# Patient Record
Sex: FEMALE | Race: WHITE | Employment: FULL TIME | URBAN - METROPOLITAN AREA
[De-identification: names, ages, dates, MRNs, and addresses within clinical notes are randomized per-mention and may not be internally consistent; named-entity substitution may affect disease eponyms.]

---

## 2022-12-05 ENCOUNTER — OFFICE VISIT (OUTPATIENT)
Dept: OCCUPATIONAL MEDICINE | Age: 22
End: 2022-12-05

## 2022-12-05 VITALS
DIASTOLIC BLOOD PRESSURE: 89 MMHG | HEIGHT: 66 IN | HEART RATE: 98 BPM | TEMPERATURE: 98.3 F | OXYGEN SATURATION: 98 % | BODY MASS INDEX: 29.73 KG/M2 | WEIGHT: 185 LBS | RESPIRATION RATE: 18 BRPM | SYSTOLIC BLOOD PRESSURE: 125 MMHG

## 2022-12-05 DIAGNOSIS — R53.81 MALAISE: ICD-10-CM

## 2022-12-05 DIAGNOSIS — J02.0 STREP THROAT: ICD-10-CM

## 2022-12-05 DIAGNOSIS — J02.9 SORE THROAT: ICD-10-CM

## 2022-12-05 DIAGNOSIS — R52 BODY ACHES: Primary | ICD-10-CM

## 2022-12-05 PROBLEM — G43.909 MIGRAINE: Status: ACTIVE | Noted: 2022-12-05

## 2022-12-05 PROBLEM — J45.909 ASTHMA: Status: ACTIVE | Noted: 2022-12-05

## 2022-12-05 PROBLEM — Z86.16 HISTORY OF COVID-19: Status: ACTIVE | Noted: 2022-12-05

## 2022-12-05 PROBLEM — G90.A POSTURAL ORTHOSTATIC TACHYCARDIA SYNDROME (POTS): Status: ACTIVE | Noted: 2022-12-05

## 2022-12-05 LAB
GROUP A STREP ANTIGEN, POC: NEGATIVE
MONONUCLEOSIS SCREEN POC: NORMAL
SARS-COV-2, POC: NORMAL
VALID INTERNAL CONTROL, POC: YES
VALID INTERNAL CONTROL: YES

## 2022-12-05 RX ORDER — FLUOXETINE HYDROCHLORIDE 20 MG/1
CAPSULE ORAL
COMMUNITY
Start: 2022-11-08 | End: 2022-12-22

## 2022-12-05 RX ORDER — SUMATRIPTAN 50 MG/1
TABLET, FILM COATED ORAL
COMMUNITY
Start: 2022-08-09

## 2022-12-05 RX ORDER — TOPIRAMATE 50 MG/1
50 TABLET, FILM COATED ORAL DAILY
COMMUNITY
Start: 2022-08-09

## 2022-12-05 RX ORDER — AZITHROMYCIN 250 MG/1
250 TABLET, FILM COATED ORAL SEE ADMIN INSTRUCTIONS
Qty: 6 TABLET | Refills: 0
Start: 2022-12-05 | End: 2022-12-10

## 2022-12-05 RX ORDER — ALBUTEROL SULFATE 90 UG/1
2 AEROSOL, METERED RESPIRATORY (INHALATION) EVERY 6 HOURS PRN
COMMUNITY

## 2022-12-05 RX ORDER — MONTELUKAST SODIUM 10 MG/1
10 TABLET ORAL DAILY
COMMUNITY
Start: 2022-08-09 | End: 2023-08-09

## 2022-12-05 ASSESSMENT — ENCOUNTER SYMPTOMS
SORE THROAT: 1
ABDOMINAL PAIN: 0
TROUBLE SWALLOWING: 0
RHINORRHEA: 0
SHORTNESS OF BREATH: 0
DIARRHEA: 0
VOMITING: 0
SINUS PAIN: 0
NAUSEA: 0

## 2022-12-05 NOTE — PROGRESS NOTES
[facility]  [phone]        Heather Lazo is a 20 y.o. female     Student reports with c/o HA, sore throat, body aches, stuffy nose, since last Tuesday (6 days). States she felt better then felt worse starting on Friday. Reports sore throat is most bothersome today. She has taken OTC tx w/o resolution. Review of Systems   Constitutional:  Negative for chills and fever. HENT:  Positive for congestion and sore throat. Negative for ear pain, rhinorrhea, sinus pain, sneezing and trouble swallowing. Respiratory:  Negative for shortness of breath. Cardiovascular:  Negative for chest pain. Gastrointestinal:  Negative for abdominal pain, diarrhea, nausea and vomiting. /89 (Site: Right Upper Arm, Position: Sitting)   Pulse 98   Temp 98.3 °F (36.8 °C) (Temporal)   Resp 18   Ht 5' 6\" (1.676 m)   Wt 185 lb (83.9 kg)   SpO2 98%   BMI 29.86 kg/m²      Physical Exam  Vitals reviewed. Constitutional:       Appearance: Normal appearance. HENT:      Head: Normocephalic and atraumatic. Right Ear: Hearing, tympanic membrane and ear canal normal.      Left Ear: Hearing, tympanic membrane and ear canal normal.      Nose: Nose normal.      Mouth/Throat:      Lips: Pink. Mouth: Mucous membranes are moist.      Pharynx: Uvula midline. Posterior oropharyngeal erythema present. Tonsils: Tonsillar exudate present. 2+ on the right. 2+ on the left. Cardiovascular:      Rate and Rhythm: Normal rate and regular rhythm. Pulmonary:      Effort: Pulmonary effort is normal.      Breath sounds: Normal breath sounds. Neurological:      Mental Status: She is alert and oriented to person, place, and time.    Psychiatric:         Mood and Affect: Mood normal.         Behavior: Behavior normal.          Current Outpatient Medications:     FLUoxetine (PROZAC) 20 MG capsule, Take by mouth 60 mg daily, Disp: , Rfl:     topiramate (TOPAMAX) 50 MG tablet, Take 50 mg by mouth daily, Disp: , Rfl:     montelukast (SINGULAIR) 10 MG tablet, Take 10 mg by mouth daily, Disp: , Rfl:     albuterol sulfate HFA (VENTOLIN HFA) 108 (90 Base) MCG/ACT inhaler, Inhale 2 puffs into the lungs every 6 hours as needed for Wheezing or Shortness of Breath, Disp: , Rfl:     Loratadine (CLARITIN PO), Take by mouth daily as needed, Disp: , Rfl:     NONFORMULARY, daily Continuous birth control daily, Disp: , Rfl:     azithromycin (ZITHROMAX) 250 MG tablet, Take 1 tablet by mouth See Admin Instructions for 5 days 500mg on day 1 followed by 250mg on days 2 - 5, Disp: 6 tablet, Rfl: 0    SUMAtriptan (IMITREX) 50 MG tablet, TAKE 1 TABLET BY MOUTH ONCE AS NEEDED FOR MIGRAINE. MAY REPEAT 25 MG DOSE IN 2 HOURS IF MIGRAINE PERSISTS (Patient not taking: Reported on 12/5/2022), Disp: , Rfl:            A:    Diagnosis Orders   1. Body aches  AMB POC SARS-COV-2      2. Sore throat  AMB POC SARS-COV-2    AMB POC RAPID STREP TEST    AMB POC MONO TEST      3. Malaise  AMB POC SARS-COV-2    AMB POC RAPID STREP TEST    AMB POC MONO TEST      4. Strep throat  azithromycin (ZITHROMAX) 250 MG tablet          P:      Recommendations: Treated for strep throat due to presentation; prescribed zpack due to amoxil allergy. Salt water gargles. Change toothbrush after 48 hours on anbx. Provided school note. Labs performed/ordered:   Results for orders placed or performed in visit on 12/05/22   AMB POC SARS-COV-2   Result Value Ref Range    SARS-COV-2, POC Not-Detected Not Detected   AMB POC RAPID STREP TEST   Result Value Ref Range    Valid Internal Control, POC yes     Group A Strep Antigen, POC Negative Negative   AMB POC MONO TEST   Result Value Ref Range    VALID INTERNAL CONTROL yes     Mononucleosis Screen, POC neg      Follow up: Patient was encouraged to return to the clinic and/or PCP if s/s persist or do not resolve completely.  Also advised to seek emergent care if worsening signs and symptoms warrant immediate evaluation including, but not limited to HA, blurred vision, speech disturbance, difficulty with ambulation/gait, numbness, tingling, weakness, syncope, abdominal pain, chest pain, or shortness of breath. *Side effects, adverse effects, risks versus benefits associated with medications prescribed/recommended were discussed with the patient. Patient verbalized understanding and agrees with treatment plan. All questions answered.       * I have reviewed the patient's medication list, past medical, family, social, and surgical history and updated the patient record appropriately      Social History     Socioeconomic History    Marital status: Not on file     Spouse name: Not on file    Number of children: Not on file    Years of education: Not on file    Highest education level: Not on file   Occupational History    Not on file   Tobacco Use    Smoking status: Never    Smokeless tobacco: Never   Substance and Sexual Activity    Alcohol use: Not on file    Drug use: Never    Sexual activity: Never   Other Topics Concern    Not on file   Social History Narrative    Not on file     Social Determinants of Health     Financial Resource Strain: Not on file   Food Insecurity: Not on file   Transportation Needs: Not on file   Physical Activity: Not on file   Stress: Not on file   Social Connections: Not on file   Intimate Partner Violence: Not on file   Housing Stability: Not on file     Past Medical History:   Diagnosis Date    Allergic rhinitis     Anxiety     Asthma     Depression     History of COVID-19     1/2021; 2/2022; 10/2022    Migraine     Postural orthostatic tachycardia syndrome (POTS)     summer 2022, after 2cd bout of COVID     Past Surgical History:   Procedure Laterality Date    WISDOM TOOTH EXTRACTION N/A      Family History   Problem Relation Age of Onset    Diabetes Mother     Depression Mother     Depression Father     Cancer Maternal Aunt     Diabetes Paternal Grandmother

## 2022-12-05 NOTE — PATIENT INSTRUCTIONS
Patient Education        Strep Throat: Care Instructions  Your Care Instructions     Strep throat is a bacterial infection that causes sudden, severe sore throat and fever. Strep throat, which is caused by bacteria called streptococcus, is treated with antibiotics. Sometimes a strep test is necessary to tell if the sore throat is caused by strep bacteria. Treatment can help ease symptoms and may prevent future problems. Follow-up care is a key part of your treatment and safety. Be sure to make and go to all appointments, and call your doctor if you are having problems. It's also a good idea to know your test results and keep a list of the medicines you take. How can you care for yourself at home? Take your antibiotics as directed. Do not stop taking them just because you feel better. You need to take the full course of antibiotics. Strep throat can spread to others until 24 hours after you begin taking antibiotics. During this time, avoid contact with other people at work, school, or home, especially infants and children. Do not sneeze or cough on others, and wash your hands often. Keep your drinking glass and eating utensils separate from those of others. Wash these items well in hot, soapy water. Gargle with warm salt water at least once each hour to help reduce swelling and make your throat feel better. Use 1 teaspoon of salt mixed in 8 fluid ounces of warm water. Take an over-the-counter pain medication, such as acetaminophen (Tylenol), ibuprofen (Advil, Motrin), or naproxen (Aleve). Read and follow all instructions on the label. Try an over-the-counter anesthetic throat spray or throat lozenges, which may help relieve throat pain. Drink plenty of fluids. Fluids may help soothe an irritated throat. Hot fluids, such as tea or soup, may help your throat feel better. Eat soft solids and drink plenty of clear liquids.  Flavored ice pops, ice cream, scrambled eggs, sherbet, and gelatin dessert (such as Jell-O) may also soothe the throat. Get lots of rest.  Do not smoke, and avoid secondhand smoke. If you need help quitting, talk to your doctor about stop-smoking programs and medicines. These can increase your chances of quitting for good. Use a vaporizer or humidifier to add moisture to the air in your bedroom. Follow the directions for cleaning the machine. When should you call for help? Call your doctor now or seek immediate medical care if:    You have a new or higher fever. You have a fever with a stiff neck or severe headache. You have new or worse trouble swallowing. Your sore throat gets much worse on one side. Your pain becomes much worse on one side of your throat. Watch closely for changes in your health, and be sure to contact your doctor if:    You are not getting better after 2 days (48 hours). You do not get better as expected. Where can you learn more? Go to [email or web address]://[email or web address][employer]Ku. org and sign in to your [employer] account. Enter K625 in the [employer] box to learn more about \"Strep Throat: Care Instructions. \"     If you do not have an account, please click on the \"Sign Up Now\" link. Current as of: May 4, 2022               Content Version: 13.4  © 2006-2022 [employer]. Care instructions adapted under license by [facility] ([facility]). If you have questions about a medical condition or this instruction, always ask your healthcare professional. [place] [address] any warranty or liability for your use of this information.

## 2023-02-06 ENCOUNTER — OFFICE VISIT (OUTPATIENT)
Dept: OCCUPATIONAL MEDICINE | Age: 23
End: 2023-02-06

## 2023-02-06 VITALS
SYSTOLIC BLOOD PRESSURE: 118 MMHG | OXYGEN SATURATION: 99 % | TEMPERATURE: 98 F | RESPIRATION RATE: 16 BRPM | DIASTOLIC BLOOD PRESSURE: 88 MMHG | HEART RATE: 98 BPM

## 2023-02-06 DIAGNOSIS — J34.89 STUFFY AND RUNNY NOSE: ICD-10-CM

## 2023-02-06 DIAGNOSIS — J02.9 SORE THROAT: ICD-10-CM

## 2023-02-06 DIAGNOSIS — R52 BODY ACHES: Primary | ICD-10-CM

## 2023-02-06 DIAGNOSIS — R51.9 ACUTE NONINTRACTABLE HEADACHE, UNSPECIFIED HEADACHE TYPE: ICD-10-CM

## 2023-02-06 LAB
INFLUENZA A ANTIGEN, POC: NEGATIVE
INFLUENZA B ANTIGEN, POC: NEGATIVE
VALID INTERNAL CONTROL, POC: YES

## 2023-02-06 ASSESSMENT — ENCOUNTER SYMPTOMS
NAUSEA: 0
SORE THROAT: 1
TROUBLE SWALLOWING: 0
VOMITING: 0
SHORTNESS OF BREATH: 0
VOICE CHANGE: 0
COUGH: 1
RHINORRHEA: 1
DIARRHEA: 0

## 2023-02-06 NOTE — PROGRESS NOTES
[facility]  [address]  [phone]    SUBJECTIVE:     Jaleel Sweeney is a 20 y.o. female     Student had COVID last month therefore not tested for COVID today. She reports body aches, stuffy/runny nose, sore throat, HA, ears full , cough, sneezing since Friday night. She has taken OTC tx. Current Outpatient Medications:     topiramate (TOPAMAX) 50 MG tablet, Take 50 mg by mouth daily, Disp: , Rfl:     montelukast (SINGULAIR) 10 MG tablet, Take 10 mg by mouth daily, Disp: , Rfl:     Loratadine (CLARITIN PO), Take by mouth daily as needed, Disp: , Rfl:     NONFORMULARY, daily Continuous birth control daily, Disp: , Rfl:     FLUoxetine (PROZAC) 20 MG capsule, Take by mouth 60 mg daily, Disp: , Rfl:     SUMAtriptan (IMITREX) 50 MG tablet, TAKE 1 TABLET BY MOUTH ONCE AS NEEDED FOR MIGRAINE. MAY REPEAT 25 MG DOSE IN 2 HOURS IF MIGRAINE PERSISTS (Patient not taking: No sig reported), Disp: , Rfl:     albuterol sulfate HFA (PROVENTIL;VENTOLIN;PROAIR) 108 (90 Base) MCG/ACT inhaler, Inhale 2 puffs into the lungs every 6 hours as needed for Wheezing or Shortness of Breath (Patient not taking: Reported on 2/6/2023), Disp: , Rfl:      Allergies   Allergen Reactions    Amoxicillin      Other reaction(s): Hives/Swelling-Allergy       Review of Systems   Constitutional:  Negative for chills and fever. HENT:  Positive for congestion, rhinorrhea, sneezing and sore throat. Negative for ear pain, trouble swallowing and voice change. Respiratory:  Positive for cough. Negative for shortness of breath. Cardiovascular:  Negative for chest pain. Gastrointestinal:  Negative for diarrhea, nausea and vomiting. OBJECTIVE:    /88 (Site: Right Upper Arm, Position: Sitting)   Pulse 98   Temp 98 °F (36.7 °C) (Temporal)   Resp 16   LMP  (Within Weeks)   SpO2 99%      Physical Exam  Vitals reviewed. Constitutional:       Appearance: Normal appearance. HENT:      Head: Normocephalic and atraumatic. Right Ear: Hearing and ear canal normal. A middle ear effusion is present. Left Ear: Hearing and ear canal normal. A middle ear effusion is present. Nose: Congestion present. Mouth/Throat:      Lips: Pink. Mouth: Mucous membranes are moist.      Pharynx: Oropharynx is clear. Uvula midline. No oropharyngeal exudate or posterior oropharyngeal erythema. Cardiovascular:      Rate and Rhythm: Normal rate and regular rhythm. Pulmonary:      Effort: Pulmonary effort is normal.      Breath sounds: Normal breath sounds. Neurological:      Mental Status: She is alert and oriented to person, place, and time. Psychiatric:         Mood and Affect: Mood normal.         Behavior: Behavior normal.        Results for orders placed or performed in visit on 02/06/23   AMB POC RAPID INFLUENZA TEST   Result Value Ref Range    Valid Internal Control, POC yes     Influenza A Antigen, POC Negative Negative    Influenza B Antigen, POC Negative Negative          ASSESSMENT and PLAN         Diagnosis Orders   1. Body aches  AMB POC RAPID INFLUENZA TEST      2. Stuffy and runny nose  AMB POC RAPID INFLUENZA TEST      3. Sore throat  AMB POC RAPID INFLUENZA TEST      4. Acute nonintractable headache, unspecified headache type  AMB POC RAPID INFLUENZA TEST            Recommendations: Discussed viral vs bacterial upper respiratory infections. Suggested OTC treatments to help alleviate symptoms. Adequate oral hydration. Use CDC precautions while sick. Monitor s/s and return to clinic or seek further evaluation in another clinical setting if s/s persist or new or worsening s/s for re-evaluation and/or re-testing. Follow up: Patient was encouraged to return to the clinic and/or PCP if s/s persist or do not resolve completely.  Also advised to seek emergent care if new or worsening signs and symptoms which warrant immediate evaluation including, but not limited to: headache, vision changes, speech disturbance, difficulty with ambulation/gait, numbness, tingling, weakness, fever (100.4 or higher), syncope, abdominal pain, chest pain, or shortness of breath. *Counseling provided on benefits of having a primary care provider which includes, but is not limited to, continuity of care and having a medical home when routine and emergent health needs arise. Also reminded patient that onsite clinic policy states that we are not to take the place of a primary care provider. Patient verbalized understanding. *Side effects, adverse effects, risks versus benefits associated with medications prescribed/recommended were discussed with the patient. Patient verbalized understanding and agrees with treatment plan. All questions answered.     * I have reviewed the patient's medication list, past medical, family, social, and surgical history and updated the patient record appropriately        Social History     Socioeconomic History    Marital status: Not on file     Spouse name: Not on file    Number of children: Not on file    Years of education: Not on file    Highest education level: Not on file   Occupational History    Not on file   Tobacco Use    Smoking status: Never    Smokeless tobacco: Never   Substance and Sexual Activity    Alcohol use: Never    Drug use: Never    Sexual activity: Never   Other Topics Concern    Not on file   Social History Narrative    Not on file     Social Determinants of Health     Financial Resource Strain: Not on file   Food Insecurity: Not on file   Transportation Needs: Not on file   Physical Activity: Not on file   Stress: Not on file   Social Connections: Not on file   Intimate Partner Violence: Not on file   Housing Stability: Not on file     Past Medical History:   Diagnosis Date    Allergic rhinitis     Anxiety     Asthma     Depression     History of COVID-19     1/2021; 2/2022; 10/2022; 1/2023    Migraine     Postural orthostatic tachycardia syndrome (POTS)     summer 2022, after 2cd bout of COVID     Past Surgical History:   Procedure Laterality Date    WISDOM TOOTH EXTRACTION N/A      Family History   Problem Relation Age of Onset    Diabetes Mother     Depression Mother     Depression Father     Cancer Maternal Aunt     Diabetes Paternal Grandmother      There are no Patient Instructions on file for this visit.